# Patient Record
Sex: MALE | Race: WHITE | NOT HISPANIC OR LATINO | Employment: FULL TIME | URBAN - METROPOLITAN AREA
[De-identification: names, ages, dates, MRNs, and addresses within clinical notes are randomized per-mention and may not be internally consistent; named-entity substitution may affect disease eponyms.]

---

## 2020-03-19 ENCOUNTER — OFFICE VISIT (OUTPATIENT)
Dept: FAMILY MEDICINE CLINIC | Facility: CLINIC | Age: 35
End: 2020-03-19
Payer: COMMERCIAL

## 2020-03-19 VITALS
BODY MASS INDEX: 19.15 KG/M2 | RESPIRATION RATE: 14 BRPM | HEART RATE: 84 BPM | WEIGHT: 122 LBS | SYSTOLIC BLOOD PRESSURE: 110 MMHG | HEIGHT: 67 IN | TEMPERATURE: 98.5 F | DIASTOLIC BLOOD PRESSURE: 70 MMHG

## 2020-03-19 DIAGNOSIS — R11.0 NAUSEA: Primary | ICD-10-CM

## 2020-03-19 PROCEDURE — 99203 OFFICE O/P NEW LOW 30 MIN: CPT | Performed by: FAMILY MEDICINE

## 2020-03-26 NOTE — PROGRESS NOTES
Assessment/Plan:    1  Nausea    No further episodes likely secondary from cleaning supplies and being in a closed room  Advised to keep door open now  Monitor for any worsening symptoms, if so patient should be evaluated  Return if symptoms worsen or fail to improve, for Annual physical     Subjective:      Patient ID: Leatha Raman is a 37 y o  male  Chief Complaint   Patient presents with    Nausea     pt states felt naucious this morning       Nausea   Associated symptoms include nausea  Pertinent negatives include no abdominal pain, chest pain, congestion, coughing, fever, headaches, rash, sore throat, vomiting or weakness  37 y/ male presents as a new patient with nausea  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 and he felt nauseous and had to leave the room  He felt better immediately after leaving the room  Patient's boss made him get evaluated to do that  Denies any further nausea, vomiting, or abdominal pain  No fevers or chills  The following portions of the patient's history were reviewed and updated as appropriate: allergies, current medications, past family history, past medical history, past social history, past surgical history and problem list     Review of Systems   Constitutional: Negative for fever  HENT: Negative for congestion and sore throat  Respiratory: Negative for cough and shortness of breath  Cardiovascular: Negative for chest pain and leg swelling  Gastrointestinal: Positive for nausea  Negative for abdominal pain, blood in stool, constipation, diarrhea and vomiting  Musculoskeletal: Negative for back pain  Skin: Negative for rash  Neurological: Negative for dizziness, weakness, light-headedness and headaches  Psychiatric/Behavioral: Negative for agitation  No current outpatient medications on file  No current facility-administered medications for this visit          Objective:    /70 (BP Location: Left arm, Patient Position: Sitting, Cuff Size: Standard)   Pulse 84   Temp 98 5 °F (36 9 °C) (Tympanic)   Resp 14   Ht 5' 7" (1 702 m)   Wt 55 3 kg (122 lb)   BMI 19 11 kg/m²        Physical Exam   Constitutional: He is oriented to person, place, and time  He appears well-developed and well-nourished  HENT:   Head: Normocephalic and atraumatic  Right Ear: External ear normal    Left Ear: External ear normal    Nose: Nose normal    Mouth/Throat: Oropharynx is clear and moist  No oropharyngeal exudate  Eyes: EOM are normal  Right eye exhibits no discharge  Left eye exhibits no discharge  Neck: Normal range of motion  Neck supple  Cardiovascular: Normal rate, regular rhythm, normal heart sounds and intact distal pulses  Pulmonary/Chest: Effort normal and breath sounds normal  He has no wheezes  Abdominal: Soft  Bowel sounds are normal  He exhibits no distension  There is no tenderness  There is no guarding  Musculoskeletal: He exhibits no edema or tenderness  Neurological: He is alert and oriented to person, place, and time  Skin: Skin is warm  No erythema  Psychiatric: He has a normal mood and affect  His behavior is normal    Vitals reviewed               Mendel Sora, MD

## 2020-06-25 ENCOUNTER — OFFICE VISIT (OUTPATIENT)
Dept: URGENT CARE | Facility: CLINIC | Age: 35
End: 2020-06-25
Payer: COMMERCIAL

## 2020-06-25 VITALS
TEMPERATURE: 99.4 F | WEIGHT: 128.4 LBS | RESPIRATION RATE: 18 BRPM | OXYGEN SATURATION: 100 % | SYSTOLIC BLOOD PRESSURE: 118 MMHG | BODY MASS INDEX: 20.15 KG/M2 | HEIGHT: 67 IN | HEART RATE: 70 BPM | DIASTOLIC BLOOD PRESSURE: 80 MMHG

## 2020-06-25 DIAGNOSIS — J02.9 SORE THROAT: Primary | ICD-10-CM

## 2020-06-25 LAB — S PYO AG THROAT QL: NEGATIVE

## 2020-06-25 PROCEDURE — 99213 OFFICE O/P EST LOW 20 MIN: CPT | Performed by: FAMILY MEDICINE

## 2020-06-25 PROCEDURE — 87070 CULTURE OTHR SPECIMN AEROBIC: CPT | Performed by: FAMILY MEDICINE

## 2020-06-25 PROCEDURE — 87880 STREP A ASSAY W/OPTIC: CPT | Performed by: FAMILY MEDICINE

## 2020-06-25 PROCEDURE — 3008F BODY MASS INDEX DOCD: CPT | Performed by: FAMILY MEDICINE

## 2020-06-27 LAB — BACTERIA THROAT CULT: NORMAL

## 2021-06-10 ENCOUNTER — OFFICE VISIT (OUTPATIENT)
Dept: FAMILY MEDICINE CLINIC | Facility: CLINIC | Age: 36
End: 2021-06-10
Payer: COMMERCIAL

## 2021-06-10 VITALS
RESPIRATION RATE: 14 BRPM | TEMPERATURE: 98.2 F | BODY MASS INDEX: 18.99 KG/M2 | DIASTOLIC BLOOD PRESSURE: 78 MMHG | WEIGHT: 121 LBS | HEART RATE: 84 BPM | HEIGHT: 67 IN | SYSTOLIC BLOOD PRESSURE: 112 MMHG

## 2021-06-10 DIAGNOSIS — R53.83 OTHER FATIGUE: ICD-10-CM

## 2021-06-10 DIAGNOSIS — K59.00 CONSTIPATION, UNSPECIFIED CONSTIPATION TYPE: ICD-10-CM

## 2021-06-10 DIAGNOSIS — R52 BODY ACHES: Primary | ICD-10-CM

## 2021-06-10 PROCEDURE — 99213 OFFICE O/P EST LOW 20 MIN: CPT | Performed by: FAMILY MEDICINE

## 2021-06-10 PROCEDURE — 3008F BODY MASS INDEX DOCD: CPT | Performed by: FAMILY MEDICINE

## 2021-06-14 NOTE — PROGRESS NOTES
Assessment/Plan:    1  Body aches  -     TSH, 3rd generation with Free T4 reflex; Future  -     NATY Screen w/ Reflex to Titer/Pattern; Future  -     RF Screen w/ Reflex to Titer; Future  -     Comprehensive metabolic panel; Future  -     CBC and Platelet; Future    2  Other fatigue  -     TSH, 3rd generation with Free T4 reflex; Future  -     NATY Screen w/ Reflex to Titer/Pattern; Future  -     RF Screen w/ Reflex to Titer; Future  -     Comprehensive metabolic panel; Future  -     CBC and Platelet; Future    3  Constipation, unspecified constipation type    Will work up patient for any abnormalities  Covid vaccine two weeks ago, unlikely cause at this time  Will rule out abnormalities  Precautions reviewed  If worsening symptoms, patient should call right away  There are no Patient Instructions on file for this visit  Return in about 2 weeks (around 6/24/2021)  Subjective:      Patient ID: Maria Esther De La Cruz is a 38 y o  male  Chief Complaint   Patient presents with    Abdominal Pain     trouble with BM    Generalized Body Aches     second covid shot 2weeks ago    Fatigue       HPI  37 y/o male presents with multiple concerns  Patient states that he feels different since getting the covid vaccine 2 weeks ago  He has generalized body aches, abdominal discomfort secondary from constipation  Last bowel movement was this morning  He states he has to strain to go to the bathroom  Patient also complains of fatigue  No fevers or chills  No night time sweats  No chest pain or shortness of breath  No exposure to covid  The following portions of the patient's history were reviewed and updated as appropriate: allergies, current medications, past family history, past medical history, past social history, past surgical history and problem list     Review of Systems   Constitutional: Positive for fatigue  Negative for fever  Eyes: Negative for visual disturbance     Respiratory: Negative for cough and shortness of breath  Cardiovascular: Negative for chest pain  Gastrointestinal: Positive for abdominal pain, constipation, diarrhea, nausea and vomiting  Genitourinary: Negative for frequency and hematuria  Musculoskeletal: Positive for arthralgias and myalgias  Neurological: Positive for headaches  Current Outpatient Medications   Medication Sig Dispense Refill    phenol (CHLORASEPTIC) 1 4 % mucosal liquid Apply 1 spray to the mouth or throat every 2 (two) hours as needed (Sore throat) (Patient not taking: Reported on 6/10/2021) 20 mL 0     No current facility-administered medications for this visit  Objective:    /78 (BP Location: Left arm, Patient Position: Sitting, Cuff Size: Standard)   Pulse 84   Temp 98 2 °F (36 8 °C) (Temporal)   Resp 14   Ht 5' 7" (1 702 m)   Wt 54 9 kg (121 lb)   BMI 18 95 kg/m²        Physical Exam  Vitals reviewed  Constitutional:       Appearance: He is well-developed  HENT:      Head: Normocephalic and atraumatic  Right Ear: Tympanic membrane and external ear normal       Left Ear: Tympanic membrane and external ear normal       Nose: Nose normal       Mouth/Throat:      Pharynx: No oropharyngeal exudate  Eyes:      General:         Right eye: No discharge  Left eye: No discharge  Conjunctiva/sclera: Conjunctivae normal    Cardiovascular:      Rate and Rhythm: Normal rate and regular rhythm  Pulses: Normal pulses  Heart sounds: Normal heart sounds  Pulmonary:      Effort: Pulmonary effort is normal       Breath sounds: Normal breath sounds  No wheezing  Abdominal:      General: Abdomen is flat  Bowel sounds are normal  There is no distension  Palpations: Abdomen is soft  Tenderness: There is no abdominal tenderness  Musculoskeletal:         General: No tenderness  Cervical back: Normal range of motion and neck supple  Right lower leg: No edema  Left lower leg: No edema     Skin: General: Skin is warm  Findings: No erythema  Neurological:      Mental Status: He is alert and oriented to person, place, and time     Psychiatric:         Mood and Affect: Mood normal          Behavior: Behavior normal                 Lala Chaudhary MD

## 2021-06-15 LAB
ALBUMIN SERPL-MCNC: 4.9 G/DL (ref 4–5)
ALBUMIN/GLOB SERPL: 2.2 {RATIO} (ref 1.2–2.2)
ALP SERPL-CCNC: 64 IU/L (ref 48–121)
ALT SERPL-CCNC: 11 IU/L (ref 0–44)
ANA TITR SER IF: NEGATIVE {TITER}
AST SERPL-CCNC: 21 IU/L (ref 0–40)
BASOPHILS # BLD AUTO: 0.1 X10E3/UL (ref 0–0.2)
BASOPHILS NFR BLD AUTO: 1 %
BILIRUB SERPL-MCNC: 0.7 MG/DL (ref 0–1.2)
BUN SERPL-MCNC: 10 MG/DL (ref 6–20)
BUN/CREAT SERPL: 11 (ref 9–20)
CALCIUM SERPL-MCNC: 9.9 MG/DL (ref 8.7–10.2)
CCP IGA+IGG SERPL IA-ACNC: 5 UNITS (ref 0–19)
CHLORIDE SERPL-SCNC: 101 MMOL/L (ref 96–106)
CO2 SERPL-SCNC: 26 MMOL/L (ref 20–29)
CREAT SERPL-MCNC: 0.87 MG/DL (ref 0.76–1.27)
EOSINOPHIL # BLD AUTO: 0.2 X10E3/UL (ref 0–0.4)
EOSINOPHIL NFR BLD AUTO: 2 %
ERYTHROCYTE [DISTWIDTH] IN BLOOD BY AUTOMATED COUNT: 12.7 % (ref 11.6–15.4)
GLOBULIN SER-MCNC: 2.2 G/DL (ref 1.5–4.5)
GLUCOSE SERPL-MCNC: 104 MG/DL (ref 65–99)
HCT VFR BLD AUTO: 46.6 % (ref 37.5–51)
HGB BLD-MCNC: 15.3 G/DL (ref 13–17.7)
IMM GRANULOCYTES # BLD: 0 X10E3/UL (ref 0–0.1)
IMM GRANULOCYTES NFR BLD: 0 %
LYMPHOCYTES # BLD AUTO: 2.9 X10E3/UL (ref 0.7–3.1)
LYMPHOCYTES NFR BLD AUTO: 23 %
MCH RBC QN AUTO: 27.6 PG (ref 26.6–33)
MCHC RBC AUTO-ENTMCNC: 32.8 G/DL (ref 31.5–35.7)
MCV RBC AUTO: 84 FL (ref 79–97)
MONOCYTES # BLD AUTO: 0.9 X10E3/UL (ref 0.1–0.9)
MONOCYTES NFR BLD AUTO: 7 %
NEUTROPHILS # BLD AUTO: 8.4 X10E3/UL (ref 1.4–7)
NEUTROPHILS NFR BLD AUTO: 67 %
PLATELET # BLD AUTO: 325 X10E3/UL (ref 150–450)
POTASSIUM SERPL-SCNC: 4.3 MMOL/L (ref 3.5–5.2)
PROT SERPL-MCNC: 7.1 G/DL (ref 6–8.5)
RBC # BLD AUTO: 5.54 X10E6/UL (ref 4.14–5.8)
RHEUMATOID FACT SERPL-ACNC: <10 IU/ML (ref 0–13.9)
SL AMB EGFR AFRICAN AMERICAN: 127 ML/MIN/1.73
SL AMB EGFR NON AFRICAN AMERICAN: 110 ML/MIN/1.73
SODIUM SERPL-SCNC: 139 MMOL/L (ref 134–144)
TSH SERPL DL<=0.005 MIU/L-ACNC: 2 UIU/ML (ref 0.45–4.5)
WBC # BLD AUTO: 12.6 X10E3/UL (ref 3.4–10.8)

## 2021-07-01 ENCOUNTER — TELEPHONE (OUTPATIENT)
Dept: FAMILY MEDICINE CLINIC | Facility: CLINIC | Age: 36
End: 2021-07-01

## 2021-07-01 LAB
BASOPHILS # BLD AUTO: 0.1 X10E3/UL (ref 0–0.2)
BASOPHILS NFR BLD AUTO: 1 %
CRP SERPL-MCNC: 2 MG/L (ref 0–10)
EOSINOPHIL # BLD AUTO: 0.2 X10E3/UL (ref 0–0.4)
EOSINOPHIL NFR BLD AUTO: 2 %
ERYTHROCYTE [DISTWIDTH] IN BLOOD BY AUTOMATED COUNT: 13 % (ref 11.6–15.4)
HCT VFR BLD AUTO: 46.5 % (ref 37.5–51)
HGB BLD-MCNC: 15.3 G/DL (ref 13–17.7)
IMM GRANULOCYTES # BLD: 0 X10E3/UL (ref 0–0.1)
IMM GRANULOCYTES NFR BLD: 0 %
LYMPHOCYTES # BLD AUTO: 2.7 X10E3/UL (ref 0.7–3.1)
LYMPHOCYTES NFR BLD AUTO: 29 %
MCH RBC QN AUTO: 27.3 PG (ref 26.6–33)
MCHC RBC AUTO-ENTMCNC: 32.9 G/DL (ref 31.5–35.7)
MCV RBC AUTO: 83 FL (ref 79–97)
MONOCYTES # BLD AUTO: 0.8 X10E3/UL (ref 0.1–0.9)
MONOCYTES NFR BLD AUTO: 9 %
NEUTROPHILS # BLD AUTO: 5.3 X10E3/UL (ref 1.4–7)
NEUTROPHILS NFR BLD AUTO: 59 %
PLATELET # BLD AUTO: 293 X10E3/UL (ref 150–450)
RBC # BLD AUTO: 5.61 X10E6/UL (ref 4.14–5.8)
WBC # BLD AUTO: 9.1 X10E3/UL (ref 3.4–10.8)

## 2021-07-01 NOTE — TELEPHONE ENCOUNTER
----- Message from Naida Arevalo MD sent at 7/1/2021 12:23 PM EDT -----  Please advise patient that all BW is stable in normal range

## 2021-09-03 ENCOUNTER — OFFICE VISIT (OUTPATIENT)
Dept: FAMILY MEDICINE CLINIC | Facility: CLINIC | Age: 36
End: 2021-09-03
Payer: COMMERCIAL

## 2021-09-03 ENCOUNTER — TELEPHONE (OUTPATIENT)
Dept: OTHER | Facility: OTHER | Age: 36
End: 2021-09-03

## 2021-09-03 VITALS
HEART RATE: 72 BPM | BODY MASS INDEX: 18.68 KG/M2 | SYSTOLIC BLOOD PRESSURE: 122 MMHG | WEIGHT: 119 LBS | DIASTOLIC BLOOD PRESSURE: 82 MMHG | OXYGEN SATURATION: 97 % | TEMPERATURE: 98 F | HEIGHT: 67 IN | RESPIRATION RATE: 14 BRPM

## 2021-09-03 DIAGNOSIS — R53.82 CHRONIC FATIGUE: ICD-10-CM

## 2021-09-03 DIAGNOSIS — G51.0 BELL'S PALSY: Primary | ICD-10-CM

## 2021-09-03 DIAGNOSIS — Z86.19 HISTORY OF SHINGLES: ICD-10-CM

## 2021-09-03 DIAGNOSIS — J02.9 SORE THROAT: ICD-10-CM

## 2021-09-03 DIAGNOSIS — R30.0 DYSURIA: ICD-10-CM

## 2021-09-03 DIAGNOSIS — R31.1 BENIGN ESSENTIAL MICROSCOPIC HEMATURIA: ICD-10-CM

## 2021-09-03 DIAGNOSIS — R63.4 WEIGHT LOSS: ICD-10-CM

## 2021-09-03 LAB
ALBUMIN SERPL-MCNC: 5.1 G/DL (ref 4–5)
ALBUMIN/GLOB SERPL: 2.3 {RATIO} (ref 1.2–2.2)
ALP SERPL-CCNC: 60 IU/L (ref 48–121)
ALT SERPL-CCNC: 13 IU/L (ref 0–44)
AST SERPL-CCNC: 22 IU/L (ref 0–40)
BASOPHILS # BLD AUTO: 0.1 X10E3/UL (ref 0–0.2)
BASOPHILS NFR BLD AUTO: 1 %
BILIRUB SERPL-MCNC: 1.3 MG/DL (ref 0–1.2)
BUN SERPL-MCNC: 11 MG/DL (ref 6–20)
BUN/CREAT SERPL: 12 (ref 9–20)
CALCIUM SERPL-MCNC: 10.3 MG/DL (ref 8.7–10.2)
CHLORIDE SERPL-SCNC: 103 MMOL/L (ref 96–106)
CO2 SERPL-SCNC: 26 MMOL/L (ref 20–29)
CREAT SERPL-MCNC: 0.93 MG/DL (ref 0.76–1.27)
EOSINOPHIL # BLD AUTO: 0.1 X10E3/UL (ref 0–0.4)
EOSINOPHIL NFR BLD AUTO: 1 %
ERYTHROCYTE [DISTWIDTH] IN BLOOD BY AUTOMATED COUNT: 13.7 % (ref 11.6–15.4)
ERYTHROCYTE [SEDIMENTATION RATE] IN BLOOD BY WESTERGREN METHOD: 2 MM/HR (ref 0–15)
GLOBULIN SER-MCNC: 2.2 G/DL (ref 1.5–4.5)
GLUCOSE SERPL-MCNC: 89 MG/DL (ref 65–99)
HCT VFR BLD AUTO: 47.5 % (ref 37.5–51)
HGB BLD-MCNC: 16 G/DL (ref 13–17.7)
IMM GRANULOCYTES # BLD: 0 X10E3/UL (ref 0–0.1)
IMM GRANULOCYTES NFR BLD: 0 %
LYMPHOCYTES # BLD AUTO: 3.3 X10E3/UL (ref 0.7–3.1)
LYMPHOCYTES NFR BLD AUTO: 25 %
MCH RBC QN AUTO: 28.1 PG (ref 26.6–33)
MCHC RBC AUTO-ENTMCNC: 33.7 G/DL (ref 31.5–35.7)
MCV RBC AUTO: 84 FL (ref 79–97)
MONOCYTES # BLD AUTO: 1 X10E3/UL (ref 0.1–0.9)
MONOCYTES NFR BLD AUTO: 8 %
NEUTROPHILS # BLD AUTO: 8.5 X10E3/UL (ref 1.4–7)
NEUTROPHILS NFR BLD AUTO: 65 %
PLATELET # BLD AUTO: 300 X10E3/UL (ref 150–450)
POTASSIUM SERPL-SCNC: 5.2 MMOL/L (ref 3.5–5.2)
PROT SERPL-MCNC: 7.3 G/DL (ref 6–8.5)
RBC # BLD AUTO: 5.69 X10E6/UL (ref 4.14–5.8)
S PYO AG THROAT QL: NEGATIVE
SL AMB EGFR AFRICAN AMERICAN: 121 ML/MIN/1.73
SL AMB EGFR NON AFRICAN AMERICAN: 105 ML/MIN/1.73
SODIUM SERPL-SCNC: 142 MMOL/L (ref 134–144)
WBC # BLD AUTO: 13.1 X10E3/UL (ref 3.4–10.8)

## 2021-09-03 PROCEDURE — 87880 STREP A ASSAY W/OPTIC: CPT | Performed by: FAMILY MEDICINE

## 2021-09-03 PROCEDURE — 81003 URINALYSIS AUTO W/O SCOPE: CPT | Performed by: FAMILY MEDICINE

## 2021-09-03 PROCEDURE — 99214 OFFICE O/P EST MOD 30 MIN: CPT | Performed by: FAMILY MEDICINE

## 2021-09-03 RX ORDER — DOXYCYCLINE HYCLATE 100 MG
100 TABLET ORAL 2 TIMES DAILY
Qty: 28 TABLET | Refills: 0 | Status: SHIPPED | OUTPATIENT
Start: 2021-09-03 | End: 2021-09-18 | Stop reason: SDUPTHER

## 2021-09-03 RX ORDER — PREDNISONE 10 MG/1
TABLET ORAL
Qty: 30 TABLET | Refills: 0 | Status: SHIPPED | OUTPATIENT
Start: 2021-09-03 | End: 2021-09-18 | Stop reason: SDUPTHER

## 2021-09-03 NOTE — TELEPHONE ENCOUNTER
Lab Result: WCB: 13 1  Calcium 10 3   Date/Time Drawn:    Ordering Provider: Dr Mehran Yoon Name: [facility]       The following critical/stat result was read back to the lab as stated above and [employer] to the on-call provider

## 2021-09-03 NOTE — TELEPHONE ENCOUNTER
I acknowledge review of critical lab value  Non emergent and no further action at this time  Further lab evaluation pending      Babs Ramirez

## 2021-09-07 ENCOUNTER — OFFICE VISIT (OUTPATIENT)
Dept: FAMILY MEDICINE CLINIC | Facility: CLINIC | Age: 36
End: 2021-09-07
Payer: COMMERCIAL

## 2021-09-07 VITALS
WEIGHT: 120.6 LBS | BODY MASS INDEX: 18.93 KG/M2 | RESPIRATION RATE: 14 BRPM | HEART RATE: 66 BPM | HEIGHT: 67 IN | SYSTOLIC BLOOD PRESSURE: 110 MMHG | TEMPERATURE: 98 F | DIASTOLIC BLOOD PRESSURE: 60 MMHG

## 2021-09-07 DIAGNOSIS — IMO0001 BODY MASS INDEX (BMI) OF 5TH TO LESS THAN 85TH PERCENTILE FOR AGE IN PATIENT 18 YEARS TO LESS THAN 21 YEARS OF AGE: ICD-10-CM

## 2021-09-07 DIAGNOSIS — B27.90 EPSTEIN BARR VIRUS INFECTION: ICD-10-CM

## 2021-09-07 DIAGNOSIS — Z86.19 HISTORY OF SHINGLES: ICD-10-CM

## 2021-09-07 DIAGNOSIS — G51.0 BELL'S PALSY: Primary | ICD-10-CM

## 2021-09-07 LAB
APPEARANCE UR: ABNORMAL
BACTERIA UR CULT: NORMAL
BACTERIA URNS QL MICRO: ABNORMAL
BILIRUB UR QL STRIP: NEGATIVE
CASTS URNS QL MICRO: ABNORMAL /LPF
COLOR UR: YELLOW
EPI CELLS #/AREA URNS HPF: ABNORMAL /HPF (ref 0–10)
GLUCOSE UR QL: NEGATIVE
HGB UR QL STRIP: ABNORMAL
KETONES UR QL STRIP: ABNORMAL
LEUKOCYTE ESTERASE UR QL STRIP: ABNORMAL
Lab: NO GROWTH
MICRO URNS: ABNORMAL
NITRITE UR QL STRIP: NEGATIVE
PH UR STRIP: 6 [PH] (ref 5–7.5)
PROT UR QL STRIP: ABNORMAL
RBC #/AREA URNS HPF: >30 /HPF (ref 0–2)
SP GR UR: 1.02 (ref 1–1.03)
UROBILINOGEN UR STRIP-ACNC: 0.2 MG/DL (ref 0.2–1)
WBC #/AREA URNS HPF: ABNORMAL /HPF (ref 0–5)
YEAST #/AREA URNS HPF: PRESENT /[HPF]

## 2021-09-07 PROCEDURE — 99213 OFFICE O/P EST LOW 20 MIN: CPT | Performed by: FAMILY MEDICINE

## 2021-09-08 ENCOUNTER — EVALUATION (OUTPATIENT)
Dept: PHYSICAL THERAPY | Facility: CLINIC | Age: 36
End: 2021-09-08
Payer: COMMERCIAL

## 2021-09-08 DIAGNOSIS — G51.0 BELL'S PALSY: ICD-10-CM

## 2021-09-08 LAB
A PHAGOCYTOPH IGG TITR SER IF: NEGATIVE {TITER}
A PHAGOCYTOPH IGM TITR SER IF: NEGATIVE {TITER}
B BURGDOR IGG PATRN SER IB-IMP: NEGATIVE
B BURGDOR IGG+IGM SER-ACNC: 1.4 ISR (ref 0–0.9)
B BURGDOR IGM PATRN SER IB-IMP: NEGATIVE
B BURGDOR IGM SER IA-ACNC: 0.87 INDEX (ref 0–0.79)
B BURGDOR18KD IGG SER QL IB: PRESENT
B BURGDOR23KD IGG SER QL IB: ABNORMAL
B BURGDOR23KD IGM SER QL IB: PRESENT
B BURGDOR28KD IGG SER QL IB: ABNORMAL
B BURGDOR30KD IGG SER QL IB: ABNORMAL
B BURGDOR39KD IGG SER QL IB: PRESENT
B BURGDOR39KD IGM SER QL IB: ABNORMAL
B BURGDOR41KD IGG SER QL IB: ABNORMAL
B BURGDOR41KD IGM SER QL IB: ABNORMAL
B BURGDOR45KD IGG SER QL IB: ABNORMAL
B BURGDOR58KD IGG SER QL IB: PRESENT
B BURGDOR66KD IGG SER QL IB: ABNORMAL
B BURGDOR93KD IGG SER QL IB: ABNORMAL
B MICROTI IGG TITR SER: NORMAL {TITER}
B MICROTI IGM TITR SER: NORMAL {TITER}
E CHAFFEENSIS IGG TITR SER IF: NEGATIVE {TITER}
E CHAFFEENSIS IGM TITR SER IF: NEGATIVE {TITER}
EBV NA IGG SER IA-ACNC: 558 U/ML (ref 0–17.9)
EBV VCA IGG SER IA-ACNC: 93.4 U/ML (ref 0–17.9)
EBV VCA IGM SER IA-ACNC: <36 U/ML (ref 0–35.9)
INTERPRETATION: ABNORMAL
SARS-COV-2 RNA SPEC QL NAA+PROBE: NOT DETECTED

## 2021-09-08 PROCEDURE — 97161 PT EVAL LOW COMPLEX 20 MIN: CPT | Performed by: PHYSICAL THERAPIST

## 2021-09-08 NOTE — PROGRESS NOTES
PT Evaluation     Today's date: 2021  Patient name: Albertina Ward  : 1985  MRN: [id number]  Referring provider: Hattie Box MD  Dx:   Encounter Diagnosis     ICD-10-CM    1  Bell's palsy  G51 0 Ambulatory referral to Physical Therapy                  Assessment  Assessment details: Gary Jauregui Alpausimonpresmacy with signs and symptoms consistent with Bell's palsy, with loss of range of motion, strength and spinal stabilization  Presents with medium reactivity  Albertina Ward would benefit with physical therapy to address these impairments to return to prior level of function  Impairments: abnormal muscle firing and impaired physical strength  Understanding of Dx/Px/POC: good   Prognosis: good    Goals  STG  Initiate HEP  Restore facial muscle function by 50% in 2 weeks  LTG  Independent with HEP  Restore full facial muscle function in 6 weeks  Plan  Planned modality interventions: biofeedback  Planned therapy interventions: manual therapy, neuromuscular re-education, postural training, patient education, strengthening, therapeutic exercise, functional ROM exercises and home exercise program  Frequency: 2x week  Duration in visits: 12  Duration in weeks: 6  Treatment plan discussed with: patient        Subjective Evaluation    History of Present Illness  Mechanism of injury: Patient reports developing facial muscle weakness about 1 week ago, he had a period of severe full body weakness and fatigue earlier in the summer  Not a recurrent problem   Pain  Current pain ratin  At best pain ratin  At worst pain ratin  Location: right side of face    Treatments  Current treatment: physical therapy  Patient Goals  Patient goals for therapy: increased strength, return to sport/leisure activities, increased motion and independence with ADLs/IADLs          Objective     Functional Assessment        Comments  The facial muscles on the right side are slow and weak  Precautions: Standard      Manuals 9/8            STM  perf                                                   Neuro Re-Ed 9/8            Biofeedback w facial expressions 10m                                                                                          Ther Ex                                                                                                                     Ther Activity                                       Gait Training                                       Modalities

## 2021-09-10 ENCOUNTER — OFFICE VISIT (OUTPATIENT)
Dept: FAMILY MEDICINE CLINIC | Facility: CLINIC | Age: 36
End: 2021-09-10
Payer: COMMERCIAL

## 2021-09-10 VITALS
RESPIRATION RATE: 12 BRPM | DIASTOLIC BLOOD PRESSURE: 72 MMHG | WEIGHT: 122 LBS | HEART RATE: 60 BPM | HEIGHT: 67 IN | TEMPERATURE: 98.3 F | BODY MASS INDEX: 19.15 KG/M2 | SYSTOLIC BLOOD PRESSURE: 130 MMHG

## 2021-09-10 DIAGNOSIS — G51.0 BELL'S PALSY: Primary | ICD-10-CM

## 2021-09-10 DIAGNOSIS — Z86.19 HISTORY OF SHINGLES: ICD-10-CM

## 2021-09-10 DIAGNOSIS — A69.20 LYME DISEASE: ICD-10-CM

## 2021-09-10 DIAGNOSIS — B27.90 EPSTEIN BARR VIRUS INFECTION: ICD-10-CM

## 2021-09-10 PROCEDURE — 99213 OFFICE O/P EST LOW 20 MIN: CPT | Performed by: FAMILY MEDICINE

## 2021-09-14 ENCOUNTER — OFFICE VISIT (OUTPATIENT)
Dept: PHYSICAL THERAPY | Facility: CLINIC | Age: 36
End: 2021-09-14
Payer: COMMERCIAL

## 2021-09-14 DIAGNOSIS — G51.0 BELL'S PALSY: Primary | ICD-10-CM

## 2021-09-14 PROCEDURE — 97112 NEUROMUSCULAR REEDUCATION: CPT | Performed by: PHYSICAL THERAPIST

## 2021-09-14 NOTE — PROGRESS NOTES
Daily Note     Today's date: 2021  Patient name: Marlon Gunter  : 1985  MRN: [id number]  Referring provider: Burt Patel MD  Dx:   Encounter Diagnosis     ICD-10-CM    1  Bell's palsy  G51 0                   Subjective: I am improving daily, better facial muscle movement  I am doing my HEP  And more aware of posture  Objective: See treatment diary below      Assessment: Tolerated treatment well  Patient demonstrated fatigue post treatment and exhibited good technique with therapeutic exercises      Plan: Continue per plan of care        Precautions: Standard      Manuals            STM  perf perf           Suboccipital release  10x                                     Neuro Re-Ed             Biofeedback w facial expressions 10m 10m           Postural Instruction  15m                                                                            Ther Ex                                                                                                                     Ther Activity                                       Gait Training                                       Modalities

## 2021-09-20 ENCOUNTER — OFFICE VISIT (OUTPATIENT)
Dept: PHYSICAL THERAPY | Facility: CLINIC | Age: 36
End: 2021-09-20
Payer: COMMERCIAL

## 2021-09-20 DIAGNOSIS — G51.0 BELL'S PALSY: Primary | ICD-10-CM

## 2021-09-20 PROCEDURE — 97112 NEUROMUSCULAR REEDUCATION: CPT | Performed by: PHYSICAL THERAPIST

## 2021-09-20 PROCEDURE — 97140 MANUAL THERAPY 1/> REGIONS: CPT | Performed by: PHYSICAL THERAPIST

## 2021-09-20 NOTE — PROGRESS NOTES
Daily Note     Today's date: 2021  Patient name: Rafael Spencer  : 1985  MRN: [id number]  Referring provider: Tony Lua MD  Dx:   Encounter Diagnosis     ICD-10-CM    1  Bell's palsy  G51 0                   Subjective: I continue with significant improvement of facial muscle function      Objective: See treatment diary below      Assessment: Tolerated treatment well  Patient demonstrated fatigue post treatment and exhibited good technique with therapeutic exercises      Plan: Continue per plan of care        Precautions: Standard      Manuals           STM  perf perf perf          Suboccipital release  10x 15x                                    Neuro Re-Ed             Biofeedback w facial expressions 10m 10m 10m          Postural Instruction  15m 10m                                                                           Ther Ex                                                                                                                     Ther Activity                                       Gait Training                                       Modalities

## 2021-09-27 ENCOUNTER — VBI (OUTPATIENT)
Dept: ADMINISTRATIVE | Facility: OTHER | Age: 36
End: 2021-09-27

## 2021-10-03 PROCEDURE — 93000 ELECTROCARDIOGRAM COMPLETE: CPT | Performed by: FAMILY MEDICINE

## 2021-10-07 ENCOUNTER — OFFICE VISIT (OUTPATIENT)
Dept: PHYSICAL THERAPY | Facility: CLINIC | Age: 36
End: 2021-10-07

## 2021-10-07 DIAGNOSIS — G51.0 BELL'S PALSY: Primary | ICD-10-CM

## 2021-10-09 ENCOUNTER — OFFICE VISIT (OUTPATIENT)
Dept: FAMILY MEDICINE CLINIC | Facility: CLINIC | Age: 36
End: 2021-10-09
Payer: COMMERCIAL

## 2021-10-09 VITALS
OXYGEN SATURATION: 97 % | RESPIRATION RATE: 14 BRPM | WEIGHT: 118.2 LBS | DIASTOLIC BLOOD PRESSURE: 64 MMHG | SYSTOLIC BLOOD PRESSURE: 92 MMHG | BODY MASS INDEX: 18.55 KG/M2 | TEMPERATURE: 98.2 F | HEIGHT: 67 IN | HEART RATE: 72 BPM

## 2021-10-09 DIAGNOSIS — H53.9 CHANGE IN VISION: ICD-10-CM

## 2021-10-09 DIAGNOSIS — Z86.19 HISTORY OF SHINGLES: ICD-10-CM

## 2021-10-09 DIAGNOSIS — Z86.19 HISTORY OF LYME DISEASE: ICD-10-CM

## 2021-10-09 DIAGNOSIS — Z86.69 HISTORY OF BELL'S PALSY: Primary | ICD-10-CM

## 2021-10-09 PROCEDURE — 99213 OFFICE O/P EST LOW 20 MIN: CPT | Performed by: FAMILY MEDICINE

## 2021-10-25 ENCOUNTER — TELEPHONE (OUTPATIENT)
Dept: FAMILY MEDICINE CLINIC | Facility: CLINIC | Age: 36
End: 2021-10-25

## 2021-10-26 ENCOUNTER — OFFICE VISIT (OUTPATIENT)
Dept: FAMILY MEDICINE CLINIC | Facility: CLINIC | Age: 36
End: 2021-10-26
Payer: COMMERCIAL

## 2021-10-26 ENCOUNTER — APPOINTMENT (OUTPATIENT)
Dept: RADIOLOGY | Facility: CLINIC | Age: 36
End: 2021-10-26
Payer: COMMERCIAL

## 2021-10-26 VITALS
TEMPERATURE: 98 F | HEART RATE: 76 BPM | WEIGHT: 119.8 LBS | BODY MASS INDEX: 18.8 KG/M2 | DIASTOLIC BLOOD PRESSURE: 72 MMHG | HEIGHT: 67 IN | RESPIRATION RATE: 14 BRPM | SYSTOLIC BLOOD PRESSURE: 100 MMHG

## 2021-10-26 DIAGNOSIS — G51.0 BELL'S PALSY: Primary | ICD-10-CM

## 2021-10-26 DIAGNOSIS — R31.9 HEMATURIA, UNSPECIFIED TYPE: ICD-10-CM

## 2021-10-26 DIAGNOSIS — R10.12 LEFT UPPER QUADRANT ABDOMINAL PAIN: ICD-10-CM

## 2021-10-26 DIAGNOSIS — R30.0 DYSURIA: ICD-10-CM

## 2021-10-26 DIAGNOSIS — R10.9 LEFT FLANK PAIN: ICD-10-CM

## 2021-10-26 DIAGNOSIS — R31.1 BENIGN ESSENTIAL MICROSCOPIC HEMATURIA: ICD-10-CM

## 2021-10-26 DIAGNOSIS — R07.81 PLEURITIC PAIN: ICD-10-CM

## 2021-10-26 DIAGNOSIS — R07.89 CHEST WALL TENDERNESS: ICD-10-CM

## 2021-10-26 LAB
SL AMB  POCT GLUCOSE, UA: ABNORMAL
SL AMB LEUKOCYTE ESTERASE,UA: 25
SL AMB POCT BILIRUBIN,UA: ABNORMAL
SL AMB POCT BLOOD,UA: 250
SL AMB POCT CLARITY,UA: CLEAR
SL AMB POCT COLOR,UA: YELLOW
SL AMB POCT KETONES,UA: ABNORMAL
SL AMB POCT NITRITE,UA: ABNORMAL
SL AMB POCT PH,UA: 5
SL AMB POCT SPECIFIC GRAVITY,UA: 1.01
SL AMB POCT URINE PROTEIN: 100
SL AMB POCT UROBILINOGEN: ABNORMAL

## 2021-10-26 PROCEDURE — 74018 RADEX ABDOMEN 1 VIEW: CPT

## 2021-10-26 PROCEDURE — 99214 OFFICE O/P EST MOD 30 MIN: CPT | Performed by: FAMILY MEDICINE

## 2021-10-26 PROCEDURE — 3008F BODY MASS INDEX DOCD: CPT | Performed by: FAMILY MEDICINE

## 2021-10-26 PROCEDURE — 81003 URINALYSIS AUTO W/O SCOPE: CPT | Performed by: FAMILY MEDICINE

## 2021-10-26 PROCEDURE — 71046 X-RAY EXAM CHEST 2 VIEWS: CPT

## 2021-10-26 RX ORDER — PREDNISONE 10 MG/1
TABLET ORAL
Qty: 30 TABLET | Refills: 0 | Status: SHIPPED | OUTPATIENT
Start: 2021-10-26

## 2021-10-26 RX ORDER — VALACYCLOVIR HYDROCHLORIDE 1 G/1
1000 TABLET, FILM COATED ORAL 3 TIMES DAILY
Qty: 21 TABLET | Refills: 0 | Status: SHIPPED | OUTPATIENT
Start: 2021-10-26 | End: 2021-11-02

## 2021-10-27 ENCOUNTER — TELEPHONE (OUTPATIENT)
Dept: FAMILY MEDICINE CLINIC | Facility: CLINIC | Age: 36
End: 2021-10-27

## 2021-10-27 LAB
ALBUMIN SERPL-MCNC: 4.8 G/DL (ref 4–5)
ALBUMIN/GLOB SERPL: 2.2 {RATIO} (ref 1.2–2.2)
ALP SERPL-CCNC: 68 IU/L (ref 44–121)
ALT SERPL-CCNC: 12 IU/L (ref 0–44)
AST SERPL-CCNC: 20 IU/L (ref 0–40)
BASOPHILS # BLD AUTO: 0.1 X10E3/UL (ref 0–0.2)
BASOPHILS NFR BLD AUTO: 1 %
BILIRUB SERPL-MCNC: 0.4 MG/DL (ref 0–1.2)
BUN SERPL-MCNC: 12 MG/DL (ref 6–20)
BUN/CREAT SERPL: 14 (ref 9–20)
CALCIUM SERPL-MCNC: 10.1 MG/DL (ref 8.7–10.2)
CHLORIDE SERPL-SCNC: 100 MMOL/L (ref 96–106)
CO2 SERPL-SCNC: 26 MMOL/L (ref 20–29)
CREAT SERPL-MCNC: 0.87 MG/DL (ref 0.76–1.27)
EOSINOPHIL # BLD AUTO: 0.1 X10E3/UL (ref 0–0.4)
EOSINOPHIL NFR BLD AUTO: 2 %
ERYTHROCYTE [DISTWIDTH] IN BLOOD BY AUTOMATED COUNT: 13 % (ref 11.6–15.4)
GLOBULIN SER-MCNC: 2.2 G/DL (ref 1.5–4.5)
GLUCOSE SERPL-MCNC: 98 MG/DL (ref 65–99)
HCT VFR BLD AUTO: 46.3 % (ref 37.5–51)
HGB BLD-MCNC: 15.4 G/DL (ref 13–17.7)
IMM GRANULOCYTES # BLD: 0 X10E3/UL (ref 0–0.1)
IMM GRANULOCYTES NFR BLD: 0 %
LYMPHOCYTES # BLD AUTO: 2.1 X10E3/UL (ref 0.7–3.1)
LYMPHOCYTES NFR BLD AUTO: 28 %
MCH RBC QN AUTO: 27.9 PG (ref 26.6–33)
MCHC RBC AUTO-ENTMCNC: 33.3 G/DL (ref 31.5–35.7)
MCV RBC AUTO: 84 FL (ref 79–97)
MONOCYTES # BLD AUTO: 1.1 X10E3/UL (ref 0.1–0.9)
MONOCYTES NFR BLD AUTO: 14 %
NEUTROPHILS # BLD AUTO: 4.1 X10E3/UL (ref 1.4–7)
NEUTROPHILS NFR BLD AUTO: 55 %
PLATELET # BLD AUTO: 290 X10E3/UL (ref 150–450)
POTASSIUM SERPL-SCNC: 4.3 MMOL/L (ref 3.5–5.2)
PROT SERPL-MCNC: 7 G/DL (ref 6–8.5)
RBC # BLD AUTO: 5.51 X10E6/UL (ref 4.14–5.8)
SL AMB EGFR AFRICAN AMERICAN: 127 ML/MIN/1.73
SL AMB EGFR NON AFRICAN AMERICAN: 110 ML/MIN/1.73
SODIUM SERPL-SCNC: 140 MMOL/L (ref 134–144)
WBC # BLD AUTO: 7.4 X10E3/UL (ref 3.4–10.8)

## 2021-10-28 ENCOUNTER — TELEPHONE (OUTPATIENT)
Dept: FAMILY MEDICINE CLINIC | Facility: CLINIC | Age: 36
End: 2021-10-28

## 2021-10-28 ENCOUNTER — TELEPHONE (OUTPATIENT)
Dept: UROLOGY | Facility: MEDICAL CENTER | Age: 36
End: 2021-10-28

## 2021-10-28 PROBLEM — R07.81 PLEURITIC PAIN: Status: ACTIVE | Noted: 2021-10-28

## 2021-10-28 PROBLEM — R31.9 HEMATURIA: Status: ACTIVE | Noted: 2021-10-28

## 2021-10-28 PROBLEM — R10.9 LEFT FLANK PAIN: Status: ACTIVE | Noted: 2021-10-28

## 2021-10-28 PROBLEM — R07.89 CHEST WALL TENDERNESS: Status: ACTIVE | Noted: 2021-10-28

## 2021-10-28 PROBLEM — R10.12 LEFT UPPER QUADRANT ABDOMINAL PAIN: Status: ACTIVE | Noted: 2021-10-28

## 2021-10-28 LAB
APPEARANCE UR: ABNORMAL
BACTERIA UR CULT: NORMAL
BACTERIA URNS QL MICRO: ABNORMAL
BILIRUB UR QL STRIP: NEGATIVE
CASTS URNS QL MICRO: ABNORMAL /LPF
COLOR UR: YELLOW
COUNSELING NOTE: NORMAL
CYTOLOGIST CVX/VAG CYTO: NORMAL
DX ICD CODE: NORMAL
EPI CELLS #/AREA URNS HPF: ABNORMAL /HPF (ref 0–10)
GLUCOSE UR QL: NEGATIVE
HGB UR QL STRIP: ABNORMAL
KETONES UR QL STRIP: NEGATIVE
LEUKOCYTE ESTERASE UR QL STRIP: NEGATIVE
Lab: NO GROWTH
MICRO URNS: ABNORMAL
NITRITE UR QL STRIP: NEGATIVE
PATH REPORT.FINAL DX SPEC: NORMAL
PATH REPORT.GROSS SPEC: NORMAL
PATH REPORT.SITE OF ORIGIN SPEC: NORMAL
PATHOLOGIST NAME: NORMAL
PH UR STRIP: 5.5 [PH] (ref 5–7.5)
PROT UR QL STRIP: ABNORMAL
RBC #/AREA URNS HPF: >30 /HPF (ref 0–2)
SP GR UR: 1.01 (ref 1–1.03)
UROBILINOGEN UR STRIP-ACNC: 0.2 MG/DL (ref 0.2–1)
WBC #/AREA URNS HPF: ABNORMAL /HPF (ref 0–5)
YEAST #/AREA URNS HPF: PRESENT /[HPF]

## 2021-10-29 ENCOUNTER — TELEPHONE (OUTPATIENT)
Dept: FAMILY MEDICINE CLINIC | Facility: CLINIC | Age: 36
End: 2021-10-29

## 2021-11-03 LAB
COLOR STONE: NORMAL
COMMENT-STONE3: NORMAL
COMPOSITION: NORMAL
LABORATORY COMMENT REPORT: NORMAL
PHOTO: NORMAL
SIZE STONE: <1 MM
SPEC SOURCE SUBJ: NORMAL
STONE ANALYSIS-IMP: NORMAL
STONE ANALYSIS-IMP: NORMAL
WT STONE: <1 MG

## 2021-12-13 ENCOUNTER — TRANSITIONAL CARE MANAGEMENT (OUTPATIENT)
Dept: FAMILY MEDICINE CLINIC | Facility: CLINIC | Age: 36
End: 2021-12-13

## 2025-01-08 ENCOUNTER — APPOINTMENT (EMERGENCY)
Dept: RADIOLOGY | Facility: HOSPITAL | Age: 40
End: 2025-01-08
Payer: COMMERCIAL

## 2025-01-08 ENCOUNTER — HOSPITAL ENCOUNTER (EMERGENCY)
Facility: HOSPITAL | Age: 40
Discharge: HOME | End: 2025-01-08
Attending: EMERGENCY MEDICINE
Payer: COMMERCIAL

## 2025-01-08 ENCOUNTER — OFFICE VISIT (OUTPATIENT)
Dept: URGENT CARE | Facility: CLINIC | Age: 40
End: 2025-01-08

## 2025-01-08 VITALS
HEIGHT: 67 IN | SYSTOLIC BLOOD PRESSURE: 138 MMHG | HEART RATE: 86 BPM | DIASTOLIC BLOOD PRESSURE: 80 MMHG | BODY MASS INDEX: 19.15 KG/M2 | TEMPERATURE: 97.7 F | RESPIRATION RATE: 18 BRPM | OXYGEN SATURATION: 96 % | WEIGHT: 122 LBS

## 2025-01-08 VITALS
HEART RATE: 62 BPM | DIASTOLIC BLOOD PRESSURE: 78 MMHG | RESPIRATION RATE: 19 BRPM | SYSTOLIC BLOOD PRESSURE: 120 MMHG | TEMPERATURE: 98.4 F | OXYGEN SATURATION: 98 %

## 2025-01-08 DIAGNOSIS — N50.89 SCROTAL EDEMA: Primary | ICD-10-CM

## 2025-01-08 DIAGNOSIS — C67.9: ICD-10-CM

## 2025-01-08 DIAGNOSIS — N50.89 SCROTAL SWELLING: Primary | ICD-10-CM

## 2025-01-08 DIAGNOSIS — R60.1 ANASARCA: ICD-10-CM

## 2025-01-08 LAB
ALBUMIN SERPL BCG-MCNC: 3.9 G/DL (ref 3.5–5)
ALP SERPL-CCNC: 99 U/L (ref 34–104)
ALT SERPL W P-5'-P-CCNC: 13 U/L (ref 7–52)
ANION GAP SERPL CALCULATED.3IONS-SCNC: 4 MMOL/L (ref 4–13)
AST SERPL W P-5'-P-CCNC: 28 U/L (ref 13–39)
BACTERIA UR QL AUTO: ABNORMAL /HPF
BASOPHILS # BLD AUTO: 0.04 THOUSANDS/ΜL (ref 0–0.1)
BASOPHILS NFR BLD AUTO: 0 % (ref 0–1)
BILIRUB SERPL-MCNC: 0.6 MG/DL (ref 0.2–1)
BILIRUB UR QL STRIP: NEGATIVE
BUN SERPL-MCNC: 20 MG/DL (ref 5–25)
CALCIUM SERPL-MCNC: 8.8 MG/DL (ref 8.4–10.2)
CHLORIDE SERPL-SCNC: 106 MMOL/L (ref 96–108)
CLARITY UR: CLEAR
CO2 SERPL-SCNC: 28 MMOL/L (ref 21–32)
COLOR UR: COLORLESS
CREAT SERPL-MCNC: 0.7 MG/DL (ref 0.6–1.3)
EOSINOPHIL # BLD AUTO: 0.21 THOUSAND/ΜL (ref 0–0.61)
EOSINOPHIL NFR BLD AUTO: 2 % (ref 0–6)
ERYTHROCYTE [DISTWIDTH] IN BLOOD BY AUTOMATED COUNT: 14.5 % (ref 11.6–15.1)
GFR SERPL CREATININE-BSD FRML MDRD: 117 ML/MIN/1.73SQ M
GLUCOSE SERPL-MCNC: 86 MG/DL (ref 65–140)
GLUCOSE UR STRIP-MCNC: NEGATIVE MG/DL
HCT VFR BLD AUTO: 39.3 % (ref 36.5–49.3)
HGB BLD-MCNC: 12.5 G/DL (ref 12–17)
HGB UR QL STRIP.AUTO: NEGATIVE
IMM GRANULOCYTES # BLD AUTO: 0.04 THOUSAND/UL (ref 0–0.2)
IMM GRANULOCYTES NFR BLD AUTO: 0 % (ref 0–2)
KETONES UR STRIP-MCNC: NEGATIVE MG/DL
LEUKOCYTE ESTERASE UR QL STRIP: NEGATIVE
LYMPHOCYTES # BLD AUTO: 1.76 THOUSANDS/ΜL (ref 0.6–4.47)
LYMPHOCYTES NFR BLD AUTO: 19 % (ref 14–44)
MCH RBC QN AUTO: 27.1 PG (ref 26.8–34.3)
MCHC RBC AUTO-ENTMCNC: 31.8 G/DL (ref 31.4–37.4)
MCV RBC AUTO: 85 FL (ref 82–98)
MONOCYTES # BLD AUTO: 1.01 THOUSAND/ΜL (ref 0.17–1.22)
MONOCYTES NFR BLD AUTO: 11 % (ref 4–12)
NEUTROPHILS # BLD AUTO: 6.45 THOUSANDS/ΜL (ref 1.85–7.62)
NEUTS SEG NFR BLD AUTO: 68 % (ref 43–75)
NITRITE UR QL STRIP: NEGATIVE
NON-SQ EPI CELLS URNS QL MICRO: ABNORMAL /HPF
NRBC BLD AUTO-RTO: 0 /100 WBCS
PH UR STRIP.AUTO: 5.5 [PH]
PLATELET # BLD AUTO: 337 THOUSANDS/UL (ref 149–390)
PMV BLD AUTO: 9.2 FL (ref 8.9–12.7)
POTASSIUM SERPL-SCNC: 3.7 MMOL/L (ref 3.5–5.3)
PROT SERPL-MCNC: 6.4 G/DL (ref 6.4–8.4)
PROT UR STRIP-MCNC: NEGATIVE MG/DL
RBC # BLD AUTO: 4.62 MILLION/UL (ref 3.88–5.62)
RBC #/AREA URNS AUTO: ABNORMAL /HPF
SODIUM SERPL-SCNC: 138 MMOL/L (ref 135–147)
SP GR UR STRIP.AUTO: <1.005 (ref 1–1.03)
UROBILINOGEN UR STRIP-ACNC: <2 MG/DL
WBC # BLD AUTO: 9.51 THOUSAND/UL (ref 4.31–10.16)
WBC #/AREA URNS AUTO: ABNORMAL /HPF

## 2025-01-08 PROCEDURE — 99213 OFFICE O/P EST LOW 20 MIN: CPT

## 2025-01-08 PROCEDURE — 80053 COMPREHEN METABOLIC PANEL: CPT | Performed by: EMERGENCY MEDICINE

## 2025-01-08 PROCEDURE — 85025 COMPLETE CBC W/AUTO DIFF WBC: CPT | Performed by: EMERGENCY MEDICINE

## 2025-01-08 PROCEDURE — 76870 US EXAM SCROTUM: CPT

## 2025-01-08 PROCEDURE — 99284 EMERGENCY DEPT VISIT MOD MDM: CPT

## 2025-01-08 PROCEDURE — 99285 EMERGENCY DEPT VISIT HI MDM: CPT | Performed by: EMERGENCY MEDICINE

## 2025-01-08 PROCEDURE — 36415 COLL VENOUS BLD VENIPUNCTURE: CPT | Performed by: EMERGENCY MEDICINE

## 2025-01-08 PROCEDURE — 81001 URINALYSIS AUTO W/SCOPE: CPT | Performed by: EMERGENCY MEDICINE

## 2025-01-08 PROCEDURE — 74177 CT ABD & PELVIS W/CONTRAST: CPT

## 2025-01-08 RX ADMIN — IOHEXOL 100 ML: 350 INJECTION, SOLUTION INTRAVENOUS at 12:16

## 2025-01-08 NOTE — DISCHARGE INSTRUCTIONS
CAT scan was concerning for recurrence of your bladder cancer.  We have included the CAT scan findings below.  It is extremely important that you follow-up with your previous oncologist in the next 1 to 2 weeks for reassessment and discussion of further treatment.  If you are unable to urinate, if you have severe abdominal pain, return to the emergency department.    CT abdomen pelvis with contrast: 1. 2 hyperenhancing, exophytic/polypoid bladder lesions, largest on the left, likely corresponding to the patient's reported bladder cancer. Diffuse bladder wall thickening, which may be due to posttreatment changes, but superimposed cystitis cannot be , excluded., 2. Extensive retroperitoneal abdominal and pelvic lymphadenopathy, as described above, including inguinal/femoral lymphadenopathy, most indicative of metastatic disease from the patient's bladder cancer or less likely lymphoma., 3. Mass effect from the standing lymphadenopathy on bilateral external iliac, right common iliac veins, as well as the inferior vena cava although the veins appear patent., 4. Anasarca involving the lower anterior abdominal and pelvic wall. Bilateral scrotal hydroceles, scrotal wall edema, and penile edema, which may be due to the lymphadenopathy resulting in mass effect on the pelvic veins in particular or impeded/delayed , lymphatic drainage

## 2025-01-08 NOTE — Clinical Note
Dallin Michelle was seen and treated in our emergency department on 1/8/2025.                Diagnosis:     Dallin  .    He may return on this date: 01/15/2025         If you have any questions or concerns, please don't hesitate to call.      Steve Espinoza MD    ______________________________           _______________          _______________  Hospital Representative                              Date                                Time

## 2025-01-08 NOTE — PROGRESS NOTES
[facility]        NAME: Dallin Michelle is a 39 y.o. male  : 1985    MRN: [id number]  DATE: 2025  TIME: 9:50 AM    Assessment and Plan   Scrotal swelling [N50.89]  1. Scrotal swelling          Scrotal swelling and hip pain for the last month that recently became sharp. History of bladder cancer and a known hernia. Recommend evaluation in the ED for imaging.     Patient Instructions     Proceed to ED for further evaluation and management    If tests are performed, our office will contact you with results only if changes need to made to the care plan discussed with you at the visit. [facility]    Chief Complaint     Chief Complaint   Patient presents with    Hip Pain     2 day hx of sharp right hip pain. Dull hip pain for over a month. Scrotum is enlarged. Pt thinks he has a hernia.  Motrin for pain          History of Present Illness       Patient presents the office today for evaluation of right hip pain and scrotal swelling for greater than 1 month.  He reports that it had been a dull aching pain in his right hip/groin which he attributed to a known hernia.  He notes that over the last few days, the pain has become sharp and constant every time he stands up.  When sitting there is no sharp sensation but the second he stands up it becomes away and continues until sitting down again.  He also reports feeling lumps and swelling in his scrotum.  Of note, he does have a history of bladder cancer.  Denies any urinary symptoms or other associated symptoms.        Review of Systems   Review of Systems   Constitutional:  Negative for chills and fever.   HENT:  Negative for congestion, postnasal drip, rhinorrhea, sinus pressure, sore throat and trouble swallowing.    Respiratory:  Negative for cough, chest tightness and shortness of breath.    Cardiovascular:  Negative for chest pain and palpitations.   Gastrointestinal:  Negative for abdominal  "pain, nausea and vomiting.   Genitourinary:  Positive for scrotal swelling. Negative for difficulty urinating, dysuria, frequency, penile discharge, penile swelling and testicular pain.   Musculoskeletal:  Negative for myalgias.   Neurological:  Negative for dizziness and headaches.         Current Medications       Current Outpatient Medications:     predniSONE 10 mg tablet, 4 tabs po x3d, then 3 tabs po x3d, then 2 tabs po x3days, then 1 tab po x 3d--take w food (Patient not taking: Reported on 1/8/2025), Disp: 30 tablet, Rfl: 0    valACYclovir (VALTREX) 1,000 mg tablet, Take 1 tablet (1,000 mg total) by mouth 3 (three) times a day for 7 days, Disp: 21 tablet, Rfl: 0    Current Allergies     Allergies as of 01/08/2025    (No Known Allergies)            The following portions of the patient's history were reviewed and updated as appropriate: allergies, current medications, past family history, past medical history, past social history, past surgical history and problem list.     Past Medical History:   Diagnosis Date    Bell's palsy     Cancer (HCC)     bladder--urologist--Dr. Sanjay Manzo    Herpes zoster without complication     Lyme disease     Visual impairment        Past Surgical History:   Procedure Laterality Date    NO PAST SURGERIES         Family History   Problem Relation Age of Onset    Arthritis Mother     Hypertension Father     Alcohol abuse Father          Medications have been verified.        Objective   /80   Pulse 86   Temp 97.7 °F (36.5 °C)   Resp 18   Ht 5' 7\" (1.702 m)   Wt 55.3 kg (122 lb)   SpO2 96%   BMI 19.11 kg/m²        Physical Exam     Physical Exam  Constitutional:       General: He is not in acute distress.  HENT:      Head: Normocephalic.      Nose: Nose normal.   Eyes:      Pupils: Pupils are equal, round, and reactive to light.   Cardiovascular:      Rate and Rhythm: Normal rate and regular rhythm.      Pulses: Normal pulses.      Heart sounds: Normal heart " sounds.   Pulmonary:      Effort: Pulmonary effort is normal.      Breath sounds: Normal breath sounds.   Abdominal:      General: Abdomen is flat.   Genitourinary:     Comments: Deferred as patient would only like one exam and is planning on going to the ED  Musculoskeletal:         General: Normal range of motion.   Skin:     General: Skin is warm and dry.      Capillary Refill: Capillary refill takes less than 2 seconds.   Neurological:      Mental Status: He is alert and oriented to person, place, and time.

## 2025-01-08 NOTE — ED PROVIDER NOTES
Time reflects when diagnosis was documented in both MDM as applicable and the Disposition within this note       Time User Action Codes Description Comment    1/8/2025  2:13 PM Steve Espinoza Add [N50.89] Scrotal edema     1/8/2025  2:13 PM Steve Espinoza Add [R60.1] Anasarca     1/8/2025  2:13 PM Steve Espinoza [C67.9] Bladder malignancy (HCC)           ED Disposition       ED Disposition   Discharge    Condition   Stable    Date/Time   Wed Jan 8, 2025  2:13 PM    Comment   Dallin Michelle discharge to home/self care.                   Assessment & Plan       Medical Decision Making  Patient with multiple separate issues.  Acute on chronic hip pain which appears musculoskeletal.  No external signs of trauma.  I ordered and independently interpreted plain films of the right hip to evaluate for fracture or dislocation.  Patient denies significant pain in the emergency department.  Patient's inguinal hernias currently reduced with no acute issue.  Discussed follow-up with general surgery for evaluation of elective repair.  Differential diagnosis for patient's scrotal swelling is broad and includes anasarca, hydrocele, scrotal abscess, testicular mass, hernia.  I ordered and reviewed lab work including CBC, CMP, urinalysis to evaluate for electrolyte or renal derangement, hepatic derangement, leukocytosis, anemia, proteinuria on urinalysis.  I ordered and reviewed a CT abdomen pelvis and a scrotal ultrasound.  CT with findings concerning for recurrence of patient's known bladder cancer.  Patient informed of these findings and provided with interpretation of CAT scan on his discharge sheet, understanding these findings. Patient will follow-up urgently with his previous oncologist.  Discharged with return precautions.    Amount and/or Complexity of Data Reviewed  External Data Reviewed: notes.     Details: Patient evaluated earlier today at urgent care clinic for complaint of scrotal swelling and  hip pain for the last month, noted prior history of bladder cancer and unknown hernia and was subsequently referred to the emergency department.  Patient did not have a  exam performed at urgent care.  Labs: ordered.  Radiology: ordered.    Risk  Prescription drug management.             Medications   iohexol (OMNIPAQUE) 350 MG/ML injection (MULTI-DOSE) 100 mL (100 mL Intravenous Given 1/8/25 1216)       ED Risk Strat Scores                                              History of Present Illness       Chief Complaint   Patient presents with    Leg Pain     Pt reports of r leg pain started a couple of days ago. Pt got hit with a box while working couple of days ago. Pt c/o r hip pain       Past Medical History:   Diagnosis Date    Bell's palsy     Cancer (HCC)     bladder--urologist--Dr. Sanjay Manzo    Herpes zoster without complication     Lyme disease     Visual impairment       Past Surgical History:   Procedure Laterality Date    NO PAST SURGERIES        Family History   Problem Relation Age of Onset    Arthritis Mother     Hypertension Father     Alcohol abuse Father       Social History     Tobacco Use    Smoking status: Every Day     Current packs/day: 0.50     Types: Cigarettes    Smokeless tobacco: Never   Vaping Use    Vaping status: Never Used   Substance Use Topics    Alcohol use: Yes     Comment: social    Drug use: Yes     Types: Marijuana     Comment: social      E-Cigarette/Vaping    E-Cigarette Use Never User       E-Cigarette/Vaping Substances    Nicotine No     THC No     CBD No       I have reviewed and agree with the history as documented.     Patient is a 39-year-old male history of bladder cancer status post resection 3 years ago presenting for evaluation of multiple symptoms including right-sided hip and thigh pain, bilateral inguinal hernias, and scrotal swelling.  Patient states that he has been having right-sided hip and thigh pain for the last 3 months, worse with exertion and  prolonged standing.  Patient denies any calf pain or swelling.  Patient does complain of some numbness of the lateral thigh as well.  Patient denies significant back pain.  Patient denies urinary retention, saddle anesthesia, fevers, chills, leg weakness or numbness aside from reported lateral thigh numbness, states some mild baseline urinary incontinence since his bladder surgery several years ago.  Patient states that 3 days ago he was at work and was struck in the side of the thigh with a box and has had worsening pain since that time.  Patient denies significant swelling in the area.  Patient currently denying any pain.  Patient separately states that he has had bilateral inguinal hernias for the past 2 to 3 years.  Patient denies any recent change in this.  Patient states mild tenderness when he presses on the, states that they pop in and out spontaneously, currently are in and denies any pain or skin changes in the area.  Patient separately complains of about 2 months of progressive scrotal and penile swelling.  Patient states that the area feels tight but denies significant pain in the area.  Patient denies penile discharge.  Patient denies any difficulty urinating.  Patient denies new sexual partners.  Patient states that he has not followed up with primary care provider or oncologist for multiple years due to issues with his insurance.  Patient denies any recent change in fatigue, denies excessive weight loss.        Review of Systems   Constitutional:  Negative for chills, fatigue, fever and unexpected weight change.   Respiratory:  Negative for cough and shortness of breath.    Gastrointestinal:  Negative for diarrhea, nausea and vomiting.   Genitourinary:  Positive for penile swelling and scrotal swelling. Negative for decreased urine volume, difficulty urinating, dysuria, flank pain, frequency, genital sores, hematuria, penile discharge, penile pain, testicular pain and urgency.   Musculoskeletal:   Positive for arthralgias (Right hip). Negative for myalgias.   Neurological:  Negative for weakness and headaches.   Psychiatric/Behavioral:  Negative for confusion.    All other systems reviewed and are negative.          Objective       ED Triage Vitals   Temperature Pulse Blood Pressure Respirations SpO2 Patient Position - Orthostatic VS   01/08/25 1000 01/08/25 1000 01/08/25 1000 01/08/25 1000 01/08/25 1000 01/08/25 1135   98.4 °F (36.9 °C) 67 122/73 18 99 % Lying      Temp Source Heart Rate Source BP Location FiO2 (%) Pain Score    01/08/25 1000 01/08/25 1000 01/08/25 1135 -- --    Tympanic Monitor Left arm        Vitals      Date and Time Temp Pulse SpO2 Resp BP Pain Score FACES Pain Rating User   01/08/25 1330 -- 62 98 % 19 120/78 -- -- CS   01/08/25 1145 -- 61 100 % 18 124/77 -- --    01/08/25 1135 -- 74 100 % 18 124/77 -- -- [facility]   01/08/25 1040 -- 62 99 % 16 111/74 -- -- [facility]   01/08/25 1000 98.4 °F (36.9 °C) 67 99 % 18 122/73 -- -- [facility]            Physical Exam  Vitals and nursing note reviewed.   Constitutional:       General: He is not in acute distress.     Appearance: Normal appearance. He is not ill-appearing, toxic-appearing or diaphoretic.      Comments: Well-appearing, nontoxic, nondistressed   HENT:      Head: Normocephalic and atraumatic.      Comments: Moist mucous membranes     Right Ear: External ear normal.      Left Ear: External ear normal.   Eyes:      General:         Right eye: No discharge.         Left eye: No discharge.   Cardiovascular:      Comments: Regular rate and rhythm, no murmurs rubs or gallops.  Extremities warm and well-perfused without mottling  Pulmonary:      Effort: No respiratory distress.      Comments: No increased work of breathing.  Speaking in complete sentences.  Lungs clear to auscultation bilaterally without wheezes, rales, rhonchi.  Satting 99% on room air indicating adequate oxygenation  Abdominal:      General: There is no distension.      Comments: Abdomen  nondistended with normal bowel sounds.  Nontender without rigidity, rebound, guarding.  No palpable inguinal hernias.   Genitourinary:     Comments: Significant bilateral scrotal swelling.  No palpable testicular mass however exam limited by degree of swelling.  Patient mildly tender in the area.  Mild overlying erythema.  Swelling extends into the penis.  Musculoskeletal:         General: No deformity.      Cervical back: Normal range of motion.      Comments: No midline thoracic or lumbar tenderness, step-off, deformity.  No tenderness of the right lateral thigh.  No overlying skin changes or hematoma.  Trace bilateral lower extremity edema   Skin:     Findings: No lesion or rash.   Neurological:      Mental Status: He is alert and oriented to person, place, and time. Mental status is at baseline.      Comments: Awake, alert, pleasant, interactive   Psychiatric:         Mood and Affect: Mood and affect normal.         Results Reviewed       Procedure Component Value Units Date/Time    Urinalysis with microscopic [[id number]]  (Abnormal) Collected: 01/08/25 1207    Lab Status: Final result Specimen: Urine, Clean Catch Updated: 01/08/25 1227     Color, UA Colorless     Clarity, UA Clear     Specific Gravity, UA <1.005     pH, UA 5.5     Leukocytes, UA Negative     Nitrite, UA Negative     Protein, UA Negative mg/dl      Glucose, UA Negative mg/dl      Ketones, UA Negative mg/dl      Urobilinogen, UA <2.0 mg/dl      Bilirubin, UA Negative     Occult Blood, UA Negative     RBC, UA None Seen /hpf      WBC, UA None Seen /hpf      Epithelial Cells None Seen /hpf      Bacteria, UA None Seen /hpf     Comprehensive metabolic panel [[id number]] Collected: 01/08/25 1042    Lab Status: Final result Specimen: Blood from Arm, Right Updated: 01/08/25 1209     Sodium 138 mmol/L      Potassium 3.7 mmol/L      Chloride 106 mmol/L      CO2 28 mmol/L      ANION GAP 4 mmol/L      BUN 20 mg/dL      Creatinine 0.70 mg/dL      Glucose 86  mg/dL      Calcium 8.8 mg/dL      AST 28 U/L      ALT 13 U/L      Alkaline Phosphatase 99 U/L      Total Protein 6.4 g/dL      Albumin 3.9 g/dL      Total Bilirubin 0.60 mg/dL      eGFR 117 ml/min/1.73sq m     Narrative:      National Kid[employer] Foundation guidelines for Chronic Kidney Disease (CKD):     Stage 1 with normal or high GFR (GFR > 90 mL/min/1.73 square meters)    Stage 2 Mild CKD (GFR = 60-89 mL/min/1.73 square meters)    Stage 3A Moderate CKD (GFR = 45-59 mL/min/1.73 square meters)    Stage 3B Moderate CKD (GFR = 30-44 mL/min/1.73 square meters)    Stage 4 Severe CKD (GFR = 15-29 mL/min/1.73 square meters)    Stage 5 End Stage CKD (GFR <15 mL/min/1.73 square meters)  Note: GFR calculation is accurate only with a steady state creatinine    CBC and differential [[id number]] Collected: 01/08/25 1042    Lab Status: Final result Specimen: Blood from Arm, Right Updated: 01/08/25 1150     WBC 9.51 Thousand/uL      RBC 4.62 Million/uL      Hemoglobin 12.5 g/dL      Hematocrit 39.3 %      MCV 85 fL      MCH 27.1 pg      MCHC 31.8 g/dL      RDW 14.5 %      MPV 9.2 fL      Platelets 337 Thousands/uL      nRBC 0 /100 WBCs      Segmented % 68 %      Immature Grans % 0 %      Lymphocytes % 19 %      Monocytes % 11 %      Eosinophils Relative 2 %      Basophils Relative 0 %      Absolute Neutrophils 6.45 Thousands/µL      Absolute Immature Grans 0.04 Thousand/uL      Absolute Lymphocytes 1.76 Thousands/µL      Absolute Monocytes 1.01 Thousand/µL      Eosinophils Absolute 0.21 Thousand/µL      Basophils Absolute 0.04 Thousands/µL             CT abdomen pelvis with contrast   Final Interpretation by Mike Dang MD (01/08 1246)         1. 2 hyperenhancing, exophytic/polypoid bladder lesions, largest on the left, likely corresponding to the patient's reported bladder cancer. Diffuse bladder wall thickening, which may be due to posttreatment changes, but superimposed cystitis cannot be    excluded.   2. Extensive  retroperitoneal abdominal and pelvic lymphadenopathy, as described above, including inguinal/femoral lymphadenopathy, most indicative of metastatic disease from the patient's bladder cancer or less likely lymphoma.   3. Mass effect from the standing lymphadenopathy on bilateral external iliac, right common iliac veins, as well as the inferior vena cava although the veins appear patent.   4. Anasarca involving the lower anterior abdominal and pelvic wall. Bilateral scrotal hydroceles, scrotal wall edema, and penile edema, which may be due to the lymphadenopathy resulting in mass effect on the pelvic veins in particular or impeded/delayed    lymphatic drainage.         The study was marked in EPIC for immediate notification.         Workstation performed: [id number]         US scrotum and testicles   Final Interpretation by Marie Montesinos MD ([id number])      No acute testicular pathology.   Moderate sized bilateral hydroceles containing debris.   Large bilateral scrotal wall thickening/edema.         Workstation performed: [id number]             Procedures    ED Medication and Procedure Management   Prior to Admission Medications   Prescriptions Last Dose Informant Patient Reported? Taking?   predniSONE 10 mg tablet   No No   Si tabs po x3d, then 3 tabs po x3d, then 2 tabs po x3days, then 1 tab po x 3d--take w food   Patient not taking: Reported on 2025   valACYclovir (VALTREX) 1,000 mg tablet   No No   Sig: Take 1 tablet (1,000 mg total) by mouth 3 (three) times a day for 7 days      Facility-Administered Medications: None     Patient's Medications   Discharge Prescriptions    No medications on file     No discharge procedures on file.  ED SEPSIS DOCUMENTATION   Time reflects when diagnosis was documented in both MDM as applicable and the Disposition within this note       Time User Action Codes Description Comment    2025  2:13 PM Steve Espinoza Add [N50.89] Scrotal edema     2025  2:13 PM  Steve Espinoza [R60.1] Anasarca     1/8/2025  2:13 PM Steve Espinoza [C67.9] Bladder malignancy (HCC)                  Steve Espinoza MD  01/08/25 1416

## 2025-01-22 ENCOUNTER — OFFICE VISIT (OUTPATIENT)
Dept: FAMILY MEDICINE CLINIC | Facility: CLINIC | Age: 40
End: 2025-01-22

## 2025-01-22 VITALS
HEIGHT: 67 IN | DIASTOLIC BLOOD PRESSURE: 70 MMHG | TEMPERATURE: 97 F | BODY MASS INDEX: 20.59 KG/M2 | SYSTOLIC BLOOD PRESSURE: 112 MMHG | RESPIRATION RATE: 18 BRPM | HEART RATE: 67 BPM | OXYGEN SATURATION: 97 % | WEIGHT: 131.2 LBS

## 2025-01-22 DIAGNOSIS — M25.551 HIP PAIN, RIGHT: Primary | ICD-10-CM

## 2025-01-22 DIAGNOSIS — Z85.51 HISTORY OF BLADDER CANCER: ICD-10-CM

## 2025-01-22 PROCEDURE — 99214 OFFICE O/P EST MOD 30 MIN: CPT

## 2025-01-22 RX ORDER — DULOXETIN HYDROCHLORIDE 30 MG/1
30 CAPSULE, DELAYED RELEASE ORAL DAILY
Qty: 30 CAPSULE | Refills: 0 | Status: SHIPPED | OUTPATIENT
Start: 2025-01-22

## 2025-01-22 RX ORDER — GABAPENTIN 100 MG/1
100 CAPSULE ORAL
Qty: 30 CAPSULE | Refills: 5 | Status: SHIPPED | OUTPATIENT
Start: 2025-01-22

## 2025-01-22 NOTE — PROGRESS NOTES
Name: Dallin Michelle      : 1985      MRN: [id number]  Encounter Provider: RADHA Iglesias  Encounter Date: 2025   Encounter department: [facility]  :  Assessment & Plan  Hip pain, right  Continue the ibprofen as needed but try to switch out with tylenol so you can give your stomach a break.  Try the cymbalta 30mg daily we will readdress in 14 days to see how it is working.  Try Gabapentin 100mg   We can trial the medications for 2 weeks and see if we need to adjust dosage to attain relief so you can go back to work.  Orders:  •  DULoxetine (Cymbalta) 30 mg delayed release capsule; Take 1 capsule (30 mg total) by mouth daily  •  gabapentin (NEURONTIN) 100 mg capsule; Take 1 capsule (100 mg total) by mouth daily at bedtime    History of bladder cancer  History of bladder cancer status post resection 3 years ago  He was seen in the ER 2025 where he had a CT abdomen and pelvis showed possible recurrence of cancer.   He has followed up with urology for the scrotal and penile swelling.They thinks the pain and swelling in his hip and groin is from pressure placed on his vasculature and nerves.   He has a oncology appointment on 25 uncertain prognosis at this time           History of Present Illness    history of bladder cancer status post resection 3 years ago presented to the emergency room for evaluation of multiple symptoms including right-sided hip and thigh pain, bilateral inguinal hernias, and scrotal swelling. He was seen in the ER 2025 where his CT abdomen and pelvis showed possible recurrence of cancer. He has followed up with urology who thinks the pain in his hip and groin is from pressure placed on his vasculature and nerves. He has a oncology appointment on 25.    Biggest complaint is the pain in right hip and groin. Has been using a cane to help with mobility.    Leg Pain   The incident occurred more than 1 week ago. The injury mechanism is  unknown. The pain is present in the right thigh, right hip and right leg. The quality of the pain is described as aching and shooting. The pain is at a severity of 7/10. The pain is severe. The pain has been Fluctuating since onset. Associated symptoms include muscle weakness, numbness (on the lateral aspect of his right thigh) and tingling. Pertinent negatives include no loss of motion. He reports no foreign bodies present. The symptoms are aggravated by movement and weight bearing. He has tried acetaminophen, NSAIDs and immobilization for the symptoms. The treatment provided mild relief.     Review of Systems   Constitutional:  Positive for activity change and fatigue. Negative for appetite change, chills, fever and unexpected weight change.   HENT:  Positive for rhinorrhea. Negative for congestion, ear pain and sore throat.    Eyes:  Negative for pain and visual disturbance.   Respiratory:  Negative for cough, chest tightness and shortness of breath.    Cardiovascular:  Negative for chest pain and palpitations.   Gastrointestinal:  Negative for abdominal pain, blood in stool, diarrhea, nausea and vomiting.   Endocrine: Negative.    Genitourinary:  Positive for penile swelling and scrotal swelling. Negative for difficulty urinating, dysuria, flank pain, frequency and hematuria.   Musculoskeletal:  Positive for gait problem. Negative for arthralgias, back pain and joint swelling.   Skin:  Positive for pallor. Negative for rash.   Allergic/Immunologic: Negative for environmental allergies.   Neurological:  Positive for tingling, weakness and numbness (on the lateral aspect of his right thigh). Negative for dizziness, seizures, syncope and headaches.   Hematological: Negative.    Psychiatric/Behavioral:  The patient is nervous/anxious.    All other systems reviewed and are negative.      Objective   /70 (BP Location: Left arm, Patient Position: Sitting, Cuff Size: Standard)   Pulse 67   Temp (!) 97 °F (36.1  "°C) (Temporal)   Resp 18   Ht 5' 7\" (1.702 m)   Wt 59.5 kg (131 lb 3.2 oz)   SpO2 97%   BMI 20.55 kg/m²      Physical Exam  Constitutional:       Appearance: He is normal weight.      Comments: Is not well appearing, and in visible pain and discomfort with mobility and while seated.   HENT:      Head: Normocephalic.      Nose: Nose normal.      Mouth/Throat:      Mouth: Mucous membranes are moist.      Pharynx: Oropharynx is clear.   Eyes:      Pupils: Pupils are equal, round, and reactive to light.   Cardiovascular:      Rate and Rhythm: Normal rate and regular rhythm.      Pulses: Normal pulses.      Heart sounds: Normal heart sounds.   Pulmonary:      Effort: Pulmonary effort is normal.      Breath sounds: Normal breath sounds. No wheezing or rhonchi.   Abdominal:      General: Bowel sounds are normal.      Tenderness: There is no abdominal tenderness.      Hernia: Hernia: bilateral inguinal hernia not bulging at this time.   Genitourinary:     Comments: Scrotal and penile swelling  Musculoskeletal:         General: Tenderness (R hip and groin area tender to palpation) present. No signs of injury.      Cervical back: Normal range of motion and neck supple.      Right lower leg: No edema.      Left lower leg: No edema.   Skin:     General: Skin is warm and dry.      Capillary Refill: Capillary refill takes 2 to 3 seconds.      Coloration: Skin is pale.      Findings: No erythema.   Neurological:      General: No focal deficit present.      Mental Status: He is alert and oriented to person, place, and time. Mental status is at baseline.      Gait: Gait abnormal (using a cane for weight bearing).   Psychiatric:         Mood and Affect: Mood normal.         Behavior: Behavior normal.         Thought Content: Thought content normal.         Judgment: Judgment normal.         "

## 2025-01-22 NOTE — LETTER
January 22, 2025     Patient: Dallin Michelle  YOB: 1985  Date of Visit: 1/22/2025      To Whom it May Concern:    Dallin Michelle is under my professional care. Dallin was seen in my office on 1/22/2025. Please excuse from work from 1/16/2025-Dallin may return to work on 1/27/2025 .    If you have any questions or concerns, please don't hesitate to call.         Sincerely,          RADHA Iglesias        CC: No Recipients

## 2025-01-22 NOTE — ASSESSMENT & PLAN NOTE
Continue the ibprofen as needed but try to switch out with tylenol so you can give your stomach a break.  Try the cymbalta 30mg daily we will readdress in 14 days to see how it is working.  Try Gabapentin 100mg   We can trial the medications for 2 weeks and see if we need to adjust dosage to attain relief so you can go back to work.  Orders:  •  DULoxetine (Cymbalta) 30 mg delayed release capsule; Take 1 capsule (30 mg total) by mouth daily  •  gabapentin (NEURONTIN) 100 mg capsule; Take 1 capsule (100 mg total) by mouth daily at bedtime

## 2025-01-24 ENCOUNTER — PATIENT MESSAGE (OUTPATIENT)
Dept: FAMILY MEDICINE CLINIC | Facility: CLINIC | Age: 40
End: 2025-01-24

## 2025-02-13 ENCOUNTER — TELEPHONE (OUTPATIENT)
Dept: FAMILY MEDICINE CLINIC | Facility: CLINIC | Age: 40
End: 2025-02-13

## 2025-05-07 NOTE — PATIENT COMMUNICATION
Mavis from [facility] is calling. They do all the [facility] disability calms for Dallin's employer - [employer]. She is calling to confirm the dates that he was placed out of work from our office. Per letter written from his visit on 1/22 - we excused him from work from 1/16/25-1/27/25.    This is the only information she needed for his claim. No further action required at this time.